# Patient Record
Sex: FEMALE | Race: WHITE | HISPANIC OR LATINO | Employment: FULL TIME | URBAN - METROPOLITAN AREA
[De-identification: names, ages, dates, MRNs, and addresses within clinical notes are randomized per-mention and may not be internally consistent; named-entity substitution may affect disease eponyms.]

---

## 2017-01-17 ENCOUNTER — ALLSCRIPTS OFFICE VISIT (OUTPATIENT)
Dept: OTHER | Facility: OTHER | Age: 42
End: 2017-01-17

## 2017-01-24 ENCOUNTER — ALLSCRIPTS OFFICE VISIT (OUTPATIENT)
Dept: OTHER | Facility: OTHER | Age: 42
End: 2017-01-24

## 2018-01-22 VITALS
WEIGHT: 175 LBS | BODY MASS INDEX: 33.61 KG/M2 | HEIGHT: 61 IN | WEIGHT: 178 LBS | DIASTOLIC BLOOD PRESSURE: 84 MMHG | HEIGHT: 61 IN | SYSTOLIC BLOOD PRESSURE: 120 MMHG | DIASTOLIC BLOOD PRESSURE: 88 MMHG | SYSTOLIC BLOOD PRESSURE: 122 MMHG | BODY MASS INDEX: 33.04 KG/M2

## 2018-03-07 NOTE — PROGRESS NOTES
Plan   Follow-up visit in 2 weeks Evaluation and Treatment  Follow-up  Status: Hold For - Scheduling  Requested for: 29AEP8742  Ordered; For: Tinnitus, bilateral;  Ordered By: Jorge Luis Zuniga  Performed:   Due: 57MCT7719     Assessment    1  Bilateral otitis externa (380 10) (H60 93)   2  Otalgia, bilateral (388 70) (H92 03)   3  Tinnitus, bilateral (388 30) (H93 13)    Chief Complaint  Chief Complaint Free Text Note Form: F/U bilateral ear infections      History of Present Illness  HPI: Ms Danyelle Santiago presents today for follow up due to bilateral otalgia and otorrhea  She has been gradually improving since her last visit  She notices the left ear feels vastly improved and the right continues with occasional otalgia  She has been using the Cortisporin drops as prescribed since her last visit  Review of Systems  Complete ENT ROS [facility]:   Eyes: No complaints of itching, excessive tearing or vision changes  Ears: discharge from the ears, but as noted in HPI  Nose: No nasal obstruction, no discharge or runniness, no bleeding, no dryness, no sneezing and no loss of smell  Mouth: No sores in mouth, no altered taste, no dental problems  Throat: No complaints of throat pain, no difficulty swallowing, no hoarseness  Neck: No neck soreness, no neck pain, no neck lumps or swelling  Genitourinary: No complaints of dysuria, flank pain or frequent urination  Cardiovascular: No complaints of chest pain or palpitations  Respiratory: No complaints of shortness of breath, cough or wheezing  Gastrointestinal: No complaints of heartburn, nausea/vomiting, or constipation  Neurological: No complaints of headache, convulsions or memory loss  Constitutional: No fever, feels well, no tiredness  Psychiatric: No anxiety, no depression, no sleep disturbances  Musculoskeletal: No complaints of arthralgias, myalgias or limb pain  Integumentary: No complaints of skin rash, itching or skin lesions     Endocrine: No complaints of proptosis, muscle weakness or feelings of weakness  Hematologic/Lymphatic: No complaints of swollen glands in the neck, does not bleed easily, does not bruise easily  ROS Reviewed:   ROS reviewed  Active Problems    1  Bilateral otitis externa (380 10) (H60 93)   2  BMI 33 0-33 9,adult (V85 33) (Z68 33)   3  Cellulitis of abdominal wall (682 2) (L03 311)   4  Immunization due (V05 9) (Z23)   5  Obesity (278 00) (E66 9)   6  Otalgia, bilateral (388 70) (H92 03)   7  Otitis externa of left ear (380 10) (H60 92)   8  Tinnitus, bilateral (388 30) (H93 13)    Past Medical History    1  History of Fissure Of The Tongue (750 13)   2  History of headache (V13 89) (D09 121)  Past Medical History Reviewed: The past medical history was reviewed and updated today  Surgical History    1  History of Tubal Ligation  Surgical History Reviewed: The surgical history was reviewed and updated today  Family History  Mother    1  Family history of arthritis (V17 7) (Z82 61)   2  Family history of cerebral infarction (V17 1) (Z82 3)  Grandmother    3  Family history of Diabetes mellitus of other type with hyperosmolarity, with long-term   current use of insulin   4  Family history of diabetes mellitus (V18 0) (Z83 3)  Family History Reviewed: The family history was reviewed and updated today  Social History    · Former smoker (F60 66) (C60 732)   ·   Social History Reviewed: The social history was reviewed and is unchanged  Current Meds   1  Ciprodex 0 3-0 1 % Otic Suspension; INSTILL 3 DROPS IN AFFECTED EAR(S) TWICE   DAILY; Therapy: 87DCV2297 to (Last Rx:[id number])  Requested for: 21TEL9690 Ordered   2  Neomycin-Polymyxin-HC 3 5-16083-7 Otic Suspension; INSTILL 4 DROPS IN BOTH   EARS 3-4 TIMES DAILY; Therapy: 04MHO4438 to (Last Rx:[id number])  Requested for: 86HRY9098 Ordered    Allergies    1   Adhesive Tape TAPE    Vitals  Signs   Recorded: 98DZS0020 63:67KC   Systolic: 011, LUE, Sitting  Diastolic: 88, LUE, Sitting  Height: 5 ft 1 in  Weight: 178 lb   BMI Calculated: 33 63  BSA Calculated: 1 8    Physical Exam    Constitutional:   General appearance: Well developed, well nourished  Ability to communicate: Voice normal  Speech normal    Head and Face:   Head and face: Head normocephalic, atraumatic with no lesions or palpable masses  Submandibular glands and parotid glands: non tender, no masses  Eyes:   Test of Ocular Motility: Gaze normal  No nystagmus  Ears:   Otoscopic examination: Abnormal  The right external canal had a discharge that was ceruminous and fungal in appearance  The left external canal had a discharge that was fungal in appearance  Hearing: Normal    Nose:   External auditory canals: No cerumen impaction noted, no drainage observed, no edema noted in EAC, no exostoses present, no osteoma present, no tenderness noted  External Inspection of Nose: No deformities observed, no deviation of bone structure, no skin lesion present, no swelling present  Nares are symmetric, no deviation of caudal portion of septum  Nasal Mucosa: No congestion observed, no mucosal lesion or masses present, no ulcerations observed  Cartilaginous Septum: midline, no bleeding noted, no crusting present, no perforation noted  Turbinates: No hypertrophy or inflammation noted  unable to visualize due to gag reflex  Mouth: Inspection of Lips, Teeth, Gums: Lips normal in color, moist, no cracks or lesions  No loose teeth, no missing teeth  Gingiva: no bleeding observed, no inflammation present  Hard Palate: no asymmetry observed, no torus present  Soft palate normal with no ulcers noted  Throat:   Examination of Oropharynx: Oral Mucosa: no masses, lesions, leukoplakia, or scarring  Normal Yunier's ducts, pink and moist, no discoloration noted  Floor of mouth: normal Warthin's ducts, no lesions, ulcerations, leukoplakia or torus mandibularis   Tonsils: no hypertrophy or ulcerations noted  Tongue: normal mobility, surfaces without fissures, leukoplakia, ulceration or masses, not enlarged, no pallor noted, no white patches present  unable to visualize due to gag reflex  Neck:   Examination of Neck: No decreased range of motion, trachea midline  Examination of Thyroid: Normal size, non-tender, no palpable masses  Pulmonary:   Respiratory effort: Normal respiratory rate and rhythm, no increased work of breathing  Cardiovascular:   Inspection of Peripheral vascular system by observation: Normal    Lymphatic:   Palpation of Lymph Nodes: Neck: No generalized lymphadenopathy  Neurological/Psychiatric:   Cranial nerves II-VII grossly intact  Oriented to person, place, and time  Cooperative, in no acute distress  Discussion/Summary  Discussion Summary:   Ms Dion Vergara presents today for follow up due to bilateral otalgia, otitis externa, and tinnitus  Noted slight improvement from last visit  Suctioned minimal amount of squamous debris and cerumen from bilateral EAC  Pt tolerated well  Powder with steroid, boric acid, and nystatin applied to bilateral EAC  TO cease use of drops at this time  f/u two weeks  Goals and Barriers: The patient has the current Goals: Continue to improve bilateral otalgia and otorrhea  Patient's Capacity to Self-Care: Patient is able to Self-Care  Transitional Care: Continuing care needed for: Otalgia and otitis externa        Signatures   Electronically signed by : URSULA Valadez ; Jan 31 2017  5:03PM EST                       (Author)

## 2018-03-07 NOTE — CONSULTS
Plan    1  Neomycin-Polymyxin-HC 3 5-67363-3 Otic Suspension; INSTILL 4 DROPS IN   BOTH EARS 3-4 TIMES DAILY    2  Removal of Foreign Body Aud Canal - [facility]; Status:Active - Perform Order; Requested   RPN:[id number];     Assessment    1  Bilateral otitis externa (380 10) (H60 93)   2  Tinnitus, bilateral (388 30) (H93 13)   3  Otalgia, bilateral (388 70) (H92 03)    Chief Complaint  Chief Complaint Free Text Note Form: Presenting bilateral ear pain, discharge and tinnitus  History of Present Illness  HPI: Ms Skinny Kaur presents today as a new patient consult, [address] [place] [address] due to bilateral ear infection  She was in a water park this past Aug 2016 and believes she got water in her ears at that time  In September she experienced an ear infection that she did not seek treatment for the concern at that time  In December 2016 she began with otalgia and otorrhea in the left ear  She was treated by her PCP with Ciprodex  The left ear began to resolve and two weeks later the right ear began with similar symptoms  She has been experiencing intermittent otalgia bilaterally and yellow otorrhea  She is also having intermittent tinnitus bilaterally  Review of Systems  Complete ENT ROS [facility]:   Eyes: No complaints of itching, excessive tearing or vision changes  Ears: discharge from the ears and recent ear infections  Nose: No nasal obstruction, no discharge or runniness, no bleeding, no dryness, no sneezing and no loss of smell  Mouth: No sores in mouth, no altered taste, no dental problems  Throat: No complaints of throat pain, no difficulty swallowing, no hoarseness  Neck: No neck soreness, no neck pain, no neck lumps or swelling  Genitourinary: No complaints of dysuria, flank pain or frequent urination  Cardiovascular: No complaints of chest pain or palpitations  Respiratory: No complaints of shortness of breath, cough or wheezing     Gastrointestinal: No complaints of heartburn, nausea/vomiting, or constipation  Neurological: headache  Constitutional: No fever, feels well, no tiredness  Psychiatric: No anxiety, no depression, no sleep disturbances  Musculoskeletal: No complaints of arthralgias, myalgias or limb pain  Integumentary: No complaints of skin rash, itching or skin lesions  Endocrine: No complaints of proptosis, muscle weakness or feelings of weakness  Hematologic/Lymphatic: No complaints of swollen glands in the neck, does not bleed easily, does not bruise easily  ROS Reviewed:   ROS reviewed  Active Problems    1  BMI 33 0-33 9,adult (V85 33) (Z68 33)   2  Cellulitis of abdominal wall (682 2) (L03 311)   3  Immunization due (V05 9) (Z23)   4  Obesity (278 00) (E66 9)   5  Otitis externa of left ear (380 10) (H60 92)    Past Medical History    1  History of Fissure Of The Tongue (750 13)   2  History of headache (V13 89) (V04 714)  Past Medical History Reviewed: The past medical history was reviewed and updated today  Surgical History    1  History of Tubal Ligation  Surgical History Reviewed: The surgical history was reviewed and updated today  Family History  Mother    1  Family history of arthritis (V17 7) (Z82 61)   2  Family history of cerebral infarction (V17 1) (Z82 3)  Grandmother    3  Family history of Diabetes mellitus of other type with hyperosmolarity, with long-term   current use of insulin   4  Family history of diabetes mellitus (V18 0) (Z83 3)  Family History Reviewed: The family history was reviewed and updated today  Social History    · Former smoker (T50 45) (V51 116)   ·   Social History Reviewed: The social history was reviewed and updated today  Current Meds   1  Ciprodex 0 3-0 1 % Otic Suspension; INSTILL 3 DROPS IN AFFECTED EAR(S) TWICE   DAILY; Therapy: 30FEY6513 to (Last Rx:[id number])  Requested for: 16Hch7490 Ordered    Allergies    1   Adhesive Tape TAPE    Vitals  Signs   Recorded: 69DXL7999 03: 45MQ   Systolic: 996  Diastolic: 84  Height: 5 ft 1 in  Weight: 175 lb   BMI Calculated: 33 07  BSA Calculated: 1 78    Physical Exam    Constitutional:   General appearance: Well developed, well nourished  Ability to communicate: Voice normal  Speech normal    Head and Face:   Head and face: Head normocephalic, atraumatic with no lesions or palpable masses  Submandibular glands and parotid glands: non tender, no masses  Eyes:   Test of Ocular Motility: Gaze normal  No nystagmus  Ears:   Otoscopic examination: Abnormal  The right external canal was erythematous and had a discharge that was ceruminous and fungal in appearance  The left external canal was erythematous and had a discharge that was fungal in appearance  Hearing: Normal    Nose:   External auditory canals: No cerumen impaction noted, no drainage observed, no edema noted in EAC, no exostoses present, no osteoma present, no tenderness noted  External Inspection of Nose: No deformities observed, no deviation of bone structure, no skin lesion present, no swelling present  Nares are symmetric, no deviation of caudal portion of septum  Nasal Mucosa: No congestion observed, no mucosal lesion or masses present, no ulcerations observed  Cartilaginous Septum: midline, no bleeding noted, no crusting present, no perforation noted  Turbinates: No hypertrophy or inflammation noted  unable to visualize due to gag reflex  Mouth: Inspection of Lips, Teeth, Gums: Lips normal in color, moist, no cracks or lesions  No loose teeth, no missing teeth  Gingiva: no bleeding observed, no inflammation present  Hard Palate: no asymmetry observed, no torus present  Soft palate normal with no ulcers noted  Throat:   Examination of Oropharynx: Oral Mucosa: no masses, lesions, leukoplakia, or scarring  Normal Yunier's ducts, pink and moist, no discoloration noted   Floor of mouth: normal Warthin's ducts, no lesions, ulcerations, leukoplakia or torus mandibularis  Tonsils: no hypertrophy or ulcerations noted  Tongue: normal mobility, surfaces without fissures, leukoplakia, ulceration or masses, not enlarged, no pallor noted, no white patches present  unable to visualize due to gag reflex  Neck:   Examination of Neck: No decreased range of motion, trachea midline  Examination of Thyroid: Normal size, non-tender, no palpable masses  Pulmonary:   Respiratory effort: Normal respiratory rate and rhythm, no increased work of breathing  Cardiovascular:   Inspection of Peripheral vascular system by observation: Normal    Lymphatic:   Palpation of Lymph Nodes: Neck: No generalized lymphadenopathy  Neurological/Psychiatric:   Cranial nerves II-VII grossly intact  Oriented to person, place, and time  Cooperative, in no acute distress  Discussion/Summary  Discussion Summary:   Ms Danyelle Santiago presents today as new patient consultation due to bilateral otitis externa, bilateral otalgia, and bilateral tinnitus  On exam, noted grey and white debris bilateral EAC, more on right versus left  Removed debris with suction  Pt tolerated well  Discussed options for treatment including Cortisporin drops  Assured pt this will resolve although may take some time  She will follow up in one to two weeks  Goals and Barriers: The patient has the current Goals: Continue to improve bilateral EAC  Patient's Capacity to Self-Care: Patient is able to Self-Care  Transitional Care: Continuing care needed for: Bilateral EAC otitis        Signatures   Electronically signed by : URSULA Wild ; Jan 17 2017  4:35PM EST                       (Author)

## 2019-04-01 ENCOUNTER — OFFICE VISIT (OUTPATIENT)
Dept: FAMILY MEDICINE CLINIC | Facility: CLINIC | Age: 44
End: 2019-04-01
Payer: COMMERCIAL

## 2019-04-01 VITALS
BODY MASS INDEX: 31.37 KG/M2 | DIASTOLIC BLOOD PRESSURE: 80 MMHG | SYSTOLIC BLOOD PRESSURE: 134 MMHG | HEART RATE: 72 BPM | WEIGHT: 166 LBS | RESPIRATION RATE: 16 BRPM | TEMPERATURE: 97.3 F

## 2019-04-01 DIAGNOSIS — H10.9 BACTERIAL CONJUNCTIVITIS OF RIGHT EYE: Primary | ICD-10-CM

## 2019-04-01 PROCEDURE — 1036F TOBACCO NON-USER: CPT | Performed by: NURSE PRACTITIONER

## 2019-04-01 PROCEDURE — 99213 OFFICE O/P EST LOW 20 MIN: CPT | Performed by: NURSE PRACTITIONER

## 2019-04-01 RX ORDER — OFLOXACIN 3 MG/ML
1 SOLUTION/ DROPS OPHTHALMIC 4 TIMES DAILY
Qty: 5 ML | Refills: 0 | Status: SHIPPED | OUTPATIENT
Start: 2019-04-01 | End: 2021-03-01

## 2019-04-01 RX ORDER — DIPHENOXYLATE HYDROCHLORIDE AND ATROPINE SULFATE 2.5; .025 MG/1; MG/1
1 TABLET ORAL DAILY
COMMUNITY

## 2021-03-01 ENCOUNTER — OFFICE VISIT (OUTPATIENT)
Dept: FAMILY MEDICINE CLINIC | Facility: CLINIC | Age: 46
End: 2021-03-01
Payer: COMMERCIAL

## 2021-03-01 VITALS
HEIGHT: 62 IN | DIASTOLIC BLOOD PRESSURE: 80 MMHG | WEIGHT: 178 LBS | TEMPERATURE: 97.8 F | OXYGEN SATURATION: 98 % | SYSTOLIC BLOOD PRESSURE: 140 MMHG | HEART RATE: 78 BPM | BODY MASS INDEX: 32.76 KG/M2 | RESPIRATION RATE: 18 BRPM

## 2021-03-01 DIAGNOSIS — Z00.00 ROUTINE ADULT HEALTH MAINTENANCE: Primary | ICD-10-CM

## 2021-03-01 DIAGNOSIS — D50.0 IRON DEFICIENCY ANEMIA DUE TO CHRONIC BLOOD LOSS: ICD-10-CM

## 2021-03-01 DIAGNOSIS — R79.89 ELEVATED PLATELET COUNT: ICD-10-CM

## 2021-03-01 PROCEDURE — 99396 PREV VISIT EST AGE 40-64: CPT | Performed by: NURSE PRACTITIONER

## 2021-03-01 PROCEDURE — 1036F TOBACCO NON-USER: CPT | Performed by: NURSE PRACTITIONER

## 2021-03-01 PROCEDURE — 3725F SCREEN DEPRESSION PERFORMED: CPT | Performed by: NURSE PRACTITIONER

## 2021-03-01 NOTE — PATIENT INSTRUCTIONS
Try to take 1 iron tablet daily  Wellness Visit for Adults   AMBULATORY CARE:   A wellness visit  is when you see your healthcare provider to get screened for health problems  Your healthcare provider will also give you advice on how to stay healthy  Write down your questions so you remember to ask them  Ask your healthcare provider how often you should have a wellness visit  What happens at a wellness visit:  Your healthcare provider will ask about your health, and your family history of health problems  This includes high blood pressure, heart disease, and cancer  He or she will ask if you have symptoms that concern you, if you smoke, and about your mood  You may also be asked about your intake of medicines, supplements, food, and alcohol  Any of the following may be done:  · Your weight  will be checked  Your height may also be checked so your body mass index (BMI) can be calculated  Your BMI shows if you are at a healthy weight  · Your blood pressure  and heart rate will be checked  Your temperature may also be checked  · Blood and urine tests  may be done  Blood tests may be done to check your cholesterol levels  Abnormal cholesterol levels increase your risk for heart disease and stroke  You may also need a blood or urine test to check for diabetes if you are at increased risk  Urine tests may be done to look for signs of an infection or kidney disease  · A physical exam  includes checking your heartbeat and lungs with a stethoscope  Your healthcare provider may also check your skin to look for sun damage  · Screening tests  may be recommended  A screening test is done to check for diseases that may not cause symptoms  The screening tests you may need depend on your age, gender, family history, and lifestyle habits  For example, colorectal screening may be recommended if you are 48years old or older      Screening tests you need if you are a woman:   · A Pap smear  is used to screen for cervical cancer  Pap smears are usually done every 3 to 5 years depending on your age  You may need them more often if you have had abnormal Pap smear test results in the past  Ask your healthcare provider how often you should have a Pap smear  · A mammogram  is an x-ray of your breasts to screen for breast cancer  Experts recommend mammograms every 2 years starting at age 48 years  You may need a mammogram at age 52 years or younger if you have an increased risk for breast cancer  Talk to your healthcare provider about when you should start having mammograms and how often you need them  Vaccines you may need:   · Get an influenza vaccine  every year  The influenza vaccine protects you from the flu  Several types of viruses cause the flu  The viruses change over time, so new vaccines are made each year  · Get a tetanus-diphtheria (Td) booster vaccine  every 10 years  This vaccine protects you against tetanus and diphtheria  Tetanus is a severe infection that may cause painful muscle spasms and lockjaw  Diphtheria is a severe bacterial infection that causes a thick covering in the back of your mouth and throat  · Get a human papillomavirus (HPV) vaccine  if you are female and aged 23 to 32 or male 23 to 24 and never received it  This vaccine protects you from HPV infection  HPV is the most common infection spread by sexual contact  HPV may also cause vaginal, penile, and anal cancers  · Get a pneumococcal vaccine  if you are aged 72 years or older  The pneumococcal vaccine is an injection given to protect you from pneumococcal disease  Pneumococcal disease is an infection caused by pneumococcal bacteria  The infection may cause pneumonia, meningitis, or an ear infection  · Get a shingles vaccine  if you are 60 or older, even if you have had shingles before  The shingles vaccine is an injection to protect you from the varicella-zoster virus  This is the same virus that causes chickenpox   Shingles is a painful rash that develops in people who had chickenpox or have been exposed to the virus  How to eat healthy:  My Plate is a model for planning healthy meals  It shows the types and amounts of foods that should go on your plate  Fruits and vegetables make up about half of your plate, and grains and protein make up the other half  A serving of dairy is included on the side of your plate  The amount of calories and serving sizes you need depends on your age, gender, weight, and height  Examples of healthy foods are listed below:  · Eat a variety of vegetables  such as dark green, red, and orange vegetables  You can also include canned vegetables low in sodium (salt) and frozen vegetables without added butter or sauces  · Eat a variety of fresh fruits , canned fruit in 100% juice, frozen fruit, and dried fruit  · Include whole grains  At least half of the grains you eat should be whole grains  Examples include whole-wheat bread, wheat pasta, brown rice, and whole-grain cereals such as oatmeal     · Eat a variety of protein foods such as seafood (fish and shellfish), lean meat, and poultry without skin (turkey and chicken)  Examples of lean meats include pork leg, shoulder, or tenderloin, and beef round, sirloin, tenderloin, and extra lean ground beef  Other protein foods include eggs and egg substitutes, beans, peas, soy products, nuts, and seeds  · Choose low-fat dairy products such as skim or 1% milk or low-fat yogurt, cheese, and cottage cheese  · Limit unhealthy fats  such as butter, hard margarine, and shortening  Exercise:  Exercise at least 30 minutes per day on most days of the week  Some examples of exercise include walking, biking, dancing, and swimming  You can also fit in more physical activity by taking the stairs instead of the elevator or parking farther away from stores  Include muscle strengthening activities 2 days each week  Regular exercise provides many health benefits   It helps you manage your weight, and decreases your risk for type 2 diabetes, heart disease, stroke, and high blood pressure  Exercise can also help improve your mood  Ask your healthcare provider about the best exercise plan for you  General health and safety guidelines:   · Do not smoke  Nicotine and other chemicals in cigarettes and cigars can cause lung damage  Ask your healthcare provider for information if you currently smoke and need help to quit  E-cigarettes or smokeless tobacco still contain nicotine  Talk to your healthcare provider before you use these products  · Limit alcohol  A drink of alcohol is 12 ounces of beer, 5 ounces of wine, or 1½ ounces of liquor  · Lose weight, if needed  Being overweight increases your risk of certain health conditions  These include heart disease, high blood pressure, type 2 diabetes, and certain types of cancer  · Protect your skin  Do not sunbathe or use tanning beds  Use sunscreen with a SPF 15 or higher  Apply sunscreen at least 15 minutes before you go outside  Reapply sunscreen every 2 hours  Wear protective clothing, hats, and sunglasses when you are outside  · Drive safely  Always wear your seatbelt  Make sure everyone in your car wears a seatbelt  A seatbelt can save your life if you are in an accident  Do not use your cell phone when you are driving  This could distract you and cause an accident  Pull over if you need to make a call or send a text message  · Practice safe sex  Use latex condoms if are sexually active and have more than one partner  Your healthcare provider may recommend screening tests for sexually transmitted infections (STIs)  · Wear helmets, lifejackets, and protective gear  Always wear a helmet when you ride a bike or motorcycle, go skiing, or play sports that could cause a head injury  Wear protective equipment when you play sports  Wear a lifejacket when you are on a boat or doing water sports      © Copyright [employer] [facility] Information is for [employer] use only and may not be sold, redistributed or otherwise used for commercial purposes  All illustrations and images included in CareNotes® are the copyrighted property of A D A M , Inc  or [employer]   The above information is an  only  It is not intended as medical advice for individual conditions or treatments  Talk to your doctor, nurse or pharmacist before following any medical regimen to see if it is safe and effective for you

## 2021-03-01 NOTE — PROGRESS NOTES
[facility] HEALTH MAINTENANCE OFFICE VISIT  [facility] - [place] [facility]  OF [facility]    NAME: Shankar Smyth  AGE: 39 y o  SEX: female  : 1975     DATE: 3/2/2021    Assessment and Plan     1  Routine adult health maintenance    2  Iron deficiency anemia due to chronic blood loss  Comments: Will check labs and refer to hematology for further workup and possible Iron infusions  Orders:  -     CBC and differential; Future  -     Iron, TIBC and Ferritin Panel; Future  -     Ambulatory referral to Hematology / Oncology; Future    3  Elevated platelet count  -     CBC and differential; Future  -     Ambulatory referral to Hematology / Oncology; Future        · Patient Counseling:   · Nutrition: Stressed importance of a well balanced diet, moderation of sodium/saturated fat, caloric balance and sufficient intake of fiber  · Exercise: Stressed the importance of regular exercise with a goal of 150 minutes per week  · Dental Health: Discussed daily flossing and brushing and regular dental visits     · Immunizations reviewed: Up To Date  · Discussed benefits of:  Mammogram , Cervical Cancer screening and Screening labs   BMI Counseling: Body mass index is 32 56 kg/m²  Discussed with patient's BMI with her  The BMI is above normal  Exercise recommendations include moderate aerobic physical activity for 150 minutes/week  No follow-ups on file  Chief Complaint     Chief Complaint   Patient presents with    Physical Exam     [id number]       History of Present Illness     She had annual labs done through her ob/gyn and her platelet count was elevated at 479  She was also anemic, which she reports is not new  Hgb 8 8 on current labs  Her menstrual cycles are very heavy  Cycles are regular and last about 4 days  Reports heavy flow for the past 1-2 days and then light  Reports significant constipation with oral iron supplements  Denies any known history of familial or hereditary anemia  Well Adult Physical   Patient here for a comprehensive physical exam       Diet and Physical Activity  Diet: well balanced diet  Exercise: walks frequently      Depression Screen  PHQ-9 Depression Screening    PHQ-9:   Frequency of the following problems over the past two weeks:      Little interest or pleasure in doing things: 0 - not at all  Feeling down, depressed, or hopeless: 0 - not at all  PHQ-2 Score: 0          General Health  Hearing: Normal:  bilateral  Vision: goes for regular eye exams and wears contacts  Dental: regular dental visits and brushes teeth twice daily    Reproductive Health  Follows with gynecologist      The following portions of the patient's history were reviewed and updated as appropriate: allergies, current medications, past family history, past medical history, past social history, past surgical history and problem list     Review of Systems     Review of Systems   Constitutional: Negative  Respiratory: Negative  Cardiovascular: Negative  Gastrointestinal: Negative  Neurological: Negative  Psychiatric/Behavioral: Negative  Past Medical History     History reviewed  No pertinent past medical history  Past Surgical History     History reviewed  No pertinent surgical history      Social History     Social History     Socioeconomic History    Marital status: /Civil Union     Spouse name: None    Number of children: None    Years of education: None    Highest education level: None   Occupational History    None   Social Needs    Financial resource strain: None    Food insecurity     Worry: None     Inability: None    Transportation needs     Medical: None     Non-medical: None   Tobacco Use    Smoking status: Never Smoker    Smokeless tobacco: Never Used   Substance and Sexual Activity    Alcohol use: Yes     Frequency: 2-4 times a month    Drug use: Never    Sexual activity: None   Lifestyle    Physical activity     Days per week: None Minutes per session: None    Stress: None   Relationships    Social connections     Talks on phone: None     Gets together: None     Attends Restorationist service: None     Active member of club or organization: None     Attends meetings of clubs or organizations: None     Relationship status: None    Intimate partner violence     Fear of current or ex partner: None     Emotionally abused: None     Physically abused: None     Forced sexual activity: None   Other Topics Concern    None   Social History Narrative    None       Family History     History reviewed  No pertinent family history  Current Medications       Current Outpatient Medications:     multivitamin (THERAGRAN) TABS, Take 1 tablet by mouth daily, Disp: , Rfl:      Allergies     Allergies   Allergen Reactions    Medical Tape        Objective     /80   Pulse 78   Temp 97 8 °F (36 6 °C)   Resp 18   Ht 5' 2" (1 575 m)   Wt 80 7 kg (178 lb)   LMP 02/23/2021 (Exact Date)   SpO2 98%   BMI 32 56 kg/m²      Physical Exam  Constitutional:       Appearance: She is well-developed  HENT:      Head: Normocephalic  Right Ear: External ear normal       Left Ear: External ear normal       Nose: Nose normal    Eyes:      Conjunctiva/sclera: Conjunctivae normal       Pupils: Pupils are equal, round, and reactive to light  Neck:      Musculoskeletal: Neck supple  Thyroid: No thyromegaly  Cardiovascular:      Rate and Rhythm: Normal rate and regular rhythm  Heart sounds: Normal heart sounds  No murmur  Pulmonary:      Effort: Pulmonary effort is normal       Breath sounds: Normal breath sounds  Abdominal:      General: Bowel sounds are normal  There is no distension  Palpations: Abdomen is soft  Musculoskeletal: Normal range of motion  Lymphadenopathy:      Cervical: No cervical adenopathy  Skin:     General: Skin is warm and dry  Capillary Refill: Capillary refill takes less than 2 seconds     Neurological: Mental Status: She is alert and oriented to person, place, and time  Deep Tendon Reflexes: Reflexes are normal and symmetric     Psychiatric:         Mood and Affect: Mood normal          Behavior: Behavior normal             Visual Acuity Screening    Right eye Left eye Both eyes   Without correction:      With correction: 20/25 20/25 20/25           Aziza Kaba[address]

## 2021-03-03 ENCOUNTER — TELEPHONE (OUTPATIENT)
Dept: ADMINISTRATIVE | Facility: OTHER | Age: 46
End: 2021-03-03

## 2021-03-03 NOTE — TELEPHONE ENCOUNTER
Upon review of the In Basket request and the patient's chart, initial outreach has been made via fax, please see Contacts section for details       Thank you  Desire Clay

## 2021-03-03 NOTE — LETTER
Procedure Request Form: Mammogram      Date Requested: 21  Patient: Inell Squire  Patient : 1975   Referring Provider: Erving Hem, CRNP        Date of Procedure ______________________________       The above patient has informed us that they have completed their   most recent Mammogram at your facility  Please complete   this form and attach all corresponding procedure reports/results  Comments __________________________________________________________  ____________________________________________________________________  ____________________________________________________________________  ____________________________________________________________________    Facility Completing Procedure _________________________________________    Form Completed By (print name) _______________________________________      Signature __________________________________________________________      These reports are needed for  compliance    Please fax this completed form and a copy of the procedure report to our office located at [place] [address] as soon as possible to [phone] attention Carmell Leventhal: Phone [phone]    We thank you for your assistance in treating our mutual patient

## 2021-03-03 NOTE — TELEPHONE ENCOUNTER
----- Message from [address] sent at 3/2/2021 11:20 AM EST -----  03/02/21 11:20 AM    Hello, our patient Ho Gnozalez has had Mammogram completed/performed  Please assist in updating the patient chart by making an External outreach to [address]  located in [place]  The date of service is February 2021      Thank you,  LOUISE Lyn  [facility]

## 2021-03-04 NOTE — TELEPHONE ENCOUNTER
Upon review of the In Basket request we were able to locate, review, and update the patient chart as requested for Mammogram     Any additional questions or concerns should be emailed to the Practice Liaisons via [email or web address] email, please do not reply via In Basket      Thank you  Carey Colunga

## 2021-03-12 ENCOUNTER — TELEPHONE (OUTPATIENT)
Dept: FAMILY MEDICINE CLINIC | Facility: CLINIC | Age: 46
End: 2021-03-12

## 2021-03-12 LAB
BASOPHILS # BLD AUTO: 83 CELLS/UL (ref 0–200)
BASOPHILS NFR BLD AUTO: 1.2 %
EOSINOPHIL # BLD AUTO: 262 CELLS/UL (ref 15–500)
EOSINOPHIL NFR BLD AUTO: 3.8 %
ERYTHROCYTE [DISTWIDTH] IN BLOOD BY AUTOMATED COUNT: 17.1 % (ref 11–15)
FERRITIN SERPL-MCNC: 3 NG/ML (ref 16–232)
HCT VFR BLD AUTO: 28.7 % (ref 35–45)
HGB BLD-MCNC: 8.4 G/DL (ref 11.7–15.5)
IRON SATN MFR SERPL: 3 % (CALC) (ref 16–45)
IRON SERPL-MCNC: 12 MCG/DL (ref 40–190)
LYMPHOCYTES # BLD AUTO: 2739 CELLS/UL (ref 850–3900)
LYMPHOCYTES NFR BLD AUTO: 39.7 %
MCH RBC QN AUTO: 20 PG (ref 27–33)
MCHC RBC AUTO-ENTMCNC: 29.3 G/DL (ref 32–36)
MCV RBC AUTO: 68.3 FL (ref 80–100)
MONOCYTES # BLD AUTO: 559 CELLS/UL (ref 200–950)
MONOCYTES NFR BLD AUTO: 8.1 %
NEUTROPHILS # BLD AUTO: 3257 CELLS/UL (ref 1500–7800)
NEUTROPHILS NFR BLD AUTO: 47.2 %
PLATELET # BLD AUTO: 448 THOUSAND/UL (ref 140–400)
PMV BLD REES-ECKER: 11.2 FL (ref 7.5–12.5)
RBC # BLD AUTO: 4.2 MILLION/UL (ref 3.8–5.1)
TIBC SERPL-MCNC: 365 MCG/DL (CALC) (ref 250–450)
WBC # BLD AUTO: 6.9 THOUSAND/UL (ref 3.8–10.8)

## 2021-03-12 NOTE — TELEPHONE ENCOUNTER
Patient returned call and she is aware, she states she will call hematologist and make an appt asap  No further action needed    Madison Lambert LPN

## 2021-03-12 NOTE — TELEPHONE ENCOUNTER
Please let pt know that her blood work shows that her iron levels are very low, and her hemoglobin has now dropped to 8 4  I gave her a referral for hematology and do not see that anything is scheduled  Please advise her to schedule an appt ASAP for further evaluation and treatment    Thanks, Yolette Cobos

## 2021-03-23 ENCOUNTER — TELEPHONE (OUTPATIENT)
Dept: SURGICAL ONCOLOGY | Facility: CLINIC | Age: 46
End: 2021-03-23

## 2021-03-23 NOTE — TELEPHONE ENCOUNTER
New Patient Encounter    New Patient Intake Form   Patient Details:  Carmen Abdul  1975  [id number]    Background Information:  [address] starts by opening a telephone encounter and gathering the following information   Who is calling to schedule? If not self, relationship to patient? self   Referring Provider [place]   What is the diagnosis? Low iron   Is this diagnosis confirmed? Yes   When was the diagnosis? 3/23   Is there a confirmed diagnosis from a biopsy/tissue reviewed by pathology? na   Were outside slides requested? No   Is patient aware of diagnosis? Yes   Is there a personal history and what kind? No   Is there a family history and what kind? No   Reason for visit? New Diagnosis   Have you had any imaging or labs done? If so: when, where? yes  [facility]   Are records in [place]? yes   If patient has a prior history of breast cancer were old records obtained? No   Was the patient told to bring a disk? No   Does the patient smoke or Vape? No   If yes, how many packs or cartridges per day? na   Scheduling Information:   Preferred [place]:  [place]     Are there any dates/time the patient cannot be seen? na   Miscellaneous: na   After completing the above information, please route to Financial Counselor and the appropriate Nurse Navigator for review

## 2021-03-30 ENCOUNTER — DOCUMENTATION (OUTPATIENT)
Dept: HEMATOLOGY ONCOLOGY | Facility: MEDICAL CENTER | Age: 46
End: 2021-03-30

## 2021-04-01 ENCOUNTER — CONSULT (OUTPATIENT)
Dept: HEMATOLOGY ONCOLOGY | Facility: MEDICAL CENTER | Age: 46
End: 2021-04-01
Payer: COMMERCIAL

## 2021-04-01 VITALS
WEIGHT: 177 LBS | TEMPERATURE: 96.9 F | SYSTOLIC BLOOD PRESSURE: 132 MMHG | BODY MASS INDEX: 32.57 KG/M2 | OXYGEN SATURATION: 100 % | DIASTOLIC BLOOD PRESSURE: 82 MMHG | HEIGHT: 62 IN | HEART RATE: 75 BPM | RESPIRATION RATE: 18 BRPM

## 2021-04-01 DIAGNOSIS — D75.838 REACTIVE THROMBOCYTOSIS: ICD-10-CM

## 2021-04-01 DIAGNOSIS — D50.0 IRON DEFICIENCY ANEMIA DUE TO CHRONIC BLOOD LOSS: Primary | ICD-10-CM

## 2021-04-01 PROCEDURE — 99204 OFFICE O/P NEW MOD 45 MIN: CPT | Performed by: PHYSICIAN ASSISTANT

## 2021-04-01 PROCEDURE — 1036F TOBACCO NON-USER: CPT | Performed by: PHYSICIAN ASSISTANT

## 2021-04-01 PROCEDURE — 3008F BODY MASS INDEX DOCD: CPT | Performed by: NURSE PRACTITIONER

## 2021-04-01 RX ORDER — SODIUM CHLORIDE 9 MG/ML
20 INJECTION, SOLUTION INTRAVENOUS ONCE
Status: CANCELLED | OUTPATIENT
Start: 2021-04-06

## 2021-04-01 NOTE — PROGRESS NOTES
[facility] [address] [address]  Hematology Ambulatory Consult  Arjun Gtz, 1975, [id number]  4/1/2021    Assessment/Plan:  1  Iron deficiency anemia due to chronic blood loss  Likely secondary to heavy menses  Suggested having a discussion with her gynecologist for options regarding heavy menses and patient is not concerned at this time as it is manageable to her  Discussed dietary sources of iron since patient cannot tolerate oral iron supplement due to nausea and constipation even taking it every other day  Will complete feraheme 510mg IV x2  Discussed side effects of IV iron including headaches, nausea, allergic reaction, tattooing of skin, and irritation around needle site  Patient to call after first infusion if any difficulty arises so the premedications can be adjusted  Will follow up in three months with labs, UA, and fecal occult blood test  Instructed patient not to complete the UA and fecal occult blood test during menses  - CBC and differential; Future  - Iron Panel (Includes Ferritin, Iron Sat%, Iron, and TIBC); Future  - Ferritin; Future  - Occult Blood, Fecal Immunochemical; Future  - UA w Reflex to Microscopic w Reflex to Culture; Future    2  Reactive thrombocytosis  Likely secondary to severe iron deficiency anemia  No features of MPN  Will repeat CBC diff in three months      - CBC and differential; Future    The patient is scheduled for follow-up in approximately 3 months with Dr Chandana Irvin  Patient voiced agreement and understanding to the above  Patient knows to call the Hematology/Oncology office with any questions and concerns regarding the above  Barrier(s) to care: None     The patient is able to self care     -------------------------------------------------------------------------------------------------------    Chief Complaint   Patient presents with   Sybil [facility] Consult     Low Iron     Referring provider:  LOUISE Costa    History of present illness: This is a 74-year-old female with past medical history of anemia starting in 2016 secondary to heavy menstruation presents to the Hematology Clinic for evaluation of severe iron deficiency with iron saturation of 3%, ferritin 3% in April 2021  Interval history: Complains of dyspnea on exertion, restless legs and cramping at night particularly during menses, and fatigue wishing she could sleep throughout the day  Denies pica, palpitations, hair loss, blood in urine or stool, dark stools, abdominal surgeries other than tubal ligation  Menses every 26 days and last 4-5 days  First two days are extremely heavy and she is using a super tampon and pad and needing to change both within 2 hour intervals  Menstruations have been heavier since 2016, age 38  Currently taking oral iron supplement every other day as well as eating foods rich in iron such as beet juice and liver  Difficulty tolerating oral iron supplement due to constipation and nausea  Denies history of blood clots, easy bleeding, bleeding from gums, blood in urine or stool, dark stool, easy bruising, petechiae  Review of Systems   Constitutional: Positive for activity change and fatigue  Negative for appetite change, fever and unexpected weight change  HENT: Negative for nosebleeds  Respiratory: Positive for shortness of breath (on exersion and with Stairs)  Negative for cough and choking  Negative hemoptysis  Cardiovascular: Negative for chest pain, palpitations and leg swelling  Gastrointestinal: Positive for constipation (oral iron)  Negative for abdominal distention, abdominal pain, anal bleeding, blood in stool, diarrhea, nausea and vomiting  Endocrine: Negative  Negative for cold intolerance  Genitourinary: Positive for menstrual problem (4 terrible days with changing pad + tampon every 1-2 hours  )   Negative for hematuria, vaginal bleeding, vaginal discharge and vaginal pain  Musculoskeletal: Positive for arthralgias (rc on periods) and myalgias (rc on periods)  Negative for neck pain and neck stiffness  Skin: Negative  Negative for color change, pallor and rash  Allergic/Immunologic: Negative  Negative for immunocompromised state  Neurological: Negative  Negative for weakness and headaches  Hematological: Negative for adenopathy  Does not bruise/bleed easily  All other systems reviewed and are negative  Patient Active Problem List   Diagnosis    Elevated platelet count    Iron deficiency anemia due to chronic blood loss       No past medical history on file      Past Surgical History:   Procedure Laterality Date    TUBAL LIGATION  2008       Family History   Problem Relation Age of Onset    Lupus Mother     Thyroid disease Sister     Hypertension Sister        Social History     Socioeconomic History    Marital status: /Civil Union     Spouse name: None    Number of children: None    Years of education: None    Highest education level: None   Occupational History    None   Social Needs    Financial resource strain: None    Food insecurity     Worry: None     Inability: None    Transportation needs     Medical: None     Non-medical: None   Tobacco Use    Smoking status: Never Smoker    Smokeless tobacco: Never Used   Substance and Sexual Activity    Alcohol use: Yes     Frequency: 2-4 times a month    Drug use: Never    Sexual activity: None   Lifestyle    Physical activity     Days per week: None     Minutes per session: None    Stress: None   Relationships    Social connections     Talks on phone: None     Gets together: None     Attends Islam service: None     Active member of club or organization: None     Attends meetings of clubs or organizations: None     Relationship status: None    Intimate partner violence     Fear of current or ex partner: None     Emotionally abused: None Physically abused: None     Forced sexual activity: None   Other Topics Concern    None   Social History Narrative    None         Current Outpatient Medications:     multivitamin (THERAGRAN) TABS, Take 1 tablet by mouth daily, Disp: , Rfl:     Allergies   Allergen Reactions    Medical Tape        Objective:  /82 (BP Location: Left arm, Patient Position: Sitting, Cuff Size: Adult)   Pulse 75   Temp (!) 96 9 °F (36 1 °C)   Resp 18   Ht 5' 2" (1 575 m)   Wt 80 3 kg (177 lb)   SpO2 100%   BMI 32 37 kg/m²   Physical Exam  Constitutional:       General: She is not in acute distress  Appearance: She is well-developed  HENT:      Head: Normocephalic and atraumatic  Mouth/Throat:      Pharynx: No oropharyngeal exudate  Eyes:      General: No scleral icterus  Conjunctiva/sclera: Conjunctivae normal       Pupils: Pupils are equal, round, and reactive to light  Neck:      Musculoskeletal: Normal range of motion  Cardiovascular:      Rate and Rhythm: Normal rate and regular rhythm  Heart sounds: No murmur  Pulmonary:      Effort: Pulmonary effort is normal  No respiratory distress  Breath sounds: Normal breath sounds  Abdominal:      General: Bowel sounds are normal  There is no distension  Palpations: Abdomen is soft  Tenderness: There is no abdominal tenderness  Musculoskeletal: Normal range of motion  Lymphadenopathy:      Cervical: No cervical adenopathy  Skin:     General: Skin is warm  Coloration: Skin is not pale  Findings: No rash  Neurological:      Mental Status: She is alert and oriented to person, place, and time  Cranial Nerves: No cranial nerve deficit  Psychiatric:         Thought Content: Thought content normal          Result Review  Labs:  No visits with results within 3 Week(s) from this visit     Latest known visit with results is:   Orders Only on 03/11/2021   Component Date Value Ref Range Status    Iron, Serum 03/11/2021 12* 40 - 190 mcg/dL Final    Total Iron Binding Capacity (TIBC) 03/11/2021 365  250 - 450 mcg/dL (calc) Final    Iron Saturation 03/11/2021 3* 16 - 45 % (calc) Final    White Blood Cell Count 03/11/2021 6 9  3 8 - 10 8 Thousand/uL Final    Red Blood Cell Count 03/11/2021 4 20  3 80 - 5 10 Million/uL Final    Hemoglobin 03/11/2021 8 4* 11 7 - 15 5 g/dL Final    HCT 03/11/2021 28 7* 35 0 - 45 0 % Final    MCV 03/11/2021 68 3* 80 0 - 100 0 fL Final    MCH 03/11/2021 20 0* 27 0 - 33 0 pg Final    MCHC 03/11/2021 29 3* 32 0 - 36 0 g/dL Final    RDW 03/11/2021 17 1* 11 0 - 15 0 % Final    Platelet Count 02/15/9207 448* 140 - 400 Thousand/uL Final    SL AMB MPV 03/11/2021 11 2  7 5 - 12 5 fL Final    Neutrophils (Absolute) 03/11/2021 3,257  1,500 - 7,800 cells/uL Final    Lymphocytes (Absolute) 03/11/2021 2,739  850 - 3,900 cells/uL Final    Monocytes (Absolute) 03/11/2021 559  200 - 950 cells/uL Final    Eosinophils (Absolute) 03/11/2021 262  15 - 500 cells/uL Final    Basophils ABS 03/11/2021 83  0 - 200 cells/uL Final    Neutrophils 03/11/2021 47 2  % Final    Lymphocytes 03/11/2021 39 7  % Final    Monocytes 03/11/2021 8 1  % Final    Eosinophils 03/11/2021 3 8  % Final    Basophils PCT 03/11/2021 1 2  % Final    Ferritin 03/11/2021 3* 16 - 232 ng/mL Final    RBC Morphology 03/11/2021   NORMAL Final    Anisocytosis 1 +Microcytosis 2 +Polychromasia 1 +Hypochromasia 1 +Ovalocytes 1 +    Note: 03/11/2021    Final     Although an "automated CBC" was ordered, ourinstrumentation detected an abnormality onyour patient's specimen requiring us to performa manual review  Please note: This report has been generated by a voice recognition software system  Therefore there may be syntax, spelling, and/or grammatical errors  Please call if you have any questions

## 2021-04-01 NOTE — PATIENT INSTRUCTIONS
Iron Rich Diet   WHAT YOU NEED TO KNOW:   An iron-rich diet includes foods that are good sources of iron  People need extra iron during childhood, adolescence (teenage years), and pregnancy  Iron is a mineral that your body needs to make hemoglobin  Hemoglobin is part of your blood and helps carry oxygen from your lungs to the rest of your body  You may need to follow this diet to treat or prevent a low blood iron level or iron deficiency anemia  DISCHARGE INSTRUCTIONS:   Daily iron needs:   · Males:      ? 3to 1years old: 7 mg    ? 3to 6years old: 10 mg    ? 5to 15years old: 8 mg    ? 15to 25years old: 11 mg    ? 19 years and older: 8 mg    · Females:      ? 3to 1years old: 7 mg    ? 3to 6years old: 10 mg    ? 5to 15years old: 8 mg    ? 15to 25years old: 15 mg    ? 19 to 50 years: 18 mg    ? Over 46years old: 8 mg    ? Pregnant women:  27 mg    Foods that contain iron:   · Meat, fish, and poultry are good sources of iron  They contain heme iron, a form of iron that your body absorbs very well  Fruit, vegetables, eggs, and grains such as pasta, rice, and cereal also contain iron  They contain nonheme iron, a form of iron that is not absorbed as well as heme iron  You can absorb more iron from these foods by eating a food that is high in vitamin C at the same time  You can also absorb more nonheme iron by eating a food from the meat, fish, and poultry group at the same time  · Fish and shellfish contain some mercury, a metal that can be harmful to the body  Children and unborn babies are at higher risk for harm caused by mercury  Children and pregnant women should avoid eating fish high in mercury, such as shark and swordfish  They should also eat only fish that are lower in mercury, such as salmon, canned light tuna, and catfish  Limit the amount of low-mercury fish and shellfish you eat to less than 12 ounces per week      Iron-rich foods:   · Foods that contain 2 mg or more per serving: ? 3 ounces of cooked beef (chad, eye of round) or cooked turkey (dark meat)    ? ½ cup of beans (black, kidney, or lentil, or soybeans)    ? ½ cup of tofu    ? 1 medium baked potato    ? 1 cup of cooked artichoke or cooked spinach    ? ¾ cup of instant oatmeal    ? 1 cup of corn flakes    · Foods that contain 1 to 2 mg per serving:      ? 3 ounces of chicken    ? 3 ounces of pork    ? 3 ounces of turkey (light meat)    ? 3 ounces of light tuna    ? ½ cup of seedless, packed raisins    ? 1 slice of whole-wheat or white bread    Good sources of vitamin C:  Eat a serving of vitamin C with any iron-rich food to help your body absorb more iron  The following fruits and vegetables are good sources of vitamin C:  · 1 cup of fresh orange juice (124 mg) or pink grapefruit juice (83 mg)    · 1 cup of strawberries (106 mg)    · 1 cup of diced cantaloupe (68 mg)     · 1 cup of sweet yellow pepper (283 mg)    · 1 cup of fresh, boiled broccoli (116 mg) or cooked brussels sprouts (97 mg)    · 1 cup of kale (53 mg)    · 1 cup of tomato juice (45 mg)    Other guidelines to follow:   · Tea and coffee can decrease the amount of iron that your body absorbs from iron-rich foods  Drink coffee and tea separately from meals that contain iron-rich foods  · Children over the age of 1 year only need about 24 ounces of cow's milk each day  When children drink too much milk, they may eat fewer iron-rich foods  This may cause them to have a low level of iron in their blood  Risks: If you do not eat iron-rich foods and vitamin C every day, you may have low blood iron levels  This may lead to iron deficiency anemia, especially during periods when your body needs extra iron  Iron deficiency anemia may cause problems with your child's growth and development  If you have iron deficiency anemia, you may develop other health problems     © Copyright [address] [place] [address] Drive Information is for End User's use only and may not be sold, redistributed or otherwise used for commercial purposes  All illustrations and images included in CareNotes® are the copyrighted property of A D A M , Inc  or [employer]  The above information is an  only  It is not intended as medical advice for individual conditions or treatments  Talk to your doctor, nurse or pharmacist before following any medical regimen to see if it is safe and effective for you  Ferumoxytol (By injection)   Ferumoxytol ([id number]-su-ZEL-i-may)  Treats iron deficiency anemia  Brand Name(s): Feraheme   There may be other brand names for this medicine  When This Medicine Should Not Be Used: This medicine is not right for everyone  You should not receive it if you had an allergic reaction to ferumoxytol or to any product that contains iron  How to Use This Medicine:   Injectable  · Your doctor will prescribe your dose and schedule  This medicine is given through a needle placed in a vein  It must be given slowly, so the needle will have to stay in place for at least 15 minutes  Your doctor may need you to stay for at least 30 minutes after receiving the medicine to check for unwanted effects  · A nurse or other health provider will give you this medicine  · Read and follow the patient instructions that come with this medicine  Talk to your doctor or pharmacist if you have any questions  Drugs and Foods to Avoid:   Ask your doctor or pharmacist before using any other medicine, including over-the-counter medicines, vitamins, and herbal products  · If you are also using cancer medicines, take them at least 30 minutes after receiving this medicine  Warnings While Using This Medicine:   · Tell your doctor if you are pregnant or breastfeeding, or if you have kidney disease, liver disease, low blood pressure, or a history of drug allergies  Tell your doctor if you are on dialysis  · This medicine may cause low blood pressure    · Tell any doctor or dentist who treats you that you are using this medicine  This medicine may affect certain medical test results  · Your doctor will do lab tests at regular visits to check on the effects of this medicine  Keep all appointments  Possible Side Effects While Using This Medicine:   Call your doctor right away if you notice any of these side effects:  · Allergic reaction: Itching or hives, swelling in your face or hands, swelling or tingling in your mouth or throat, chest tightness, trouble breathing  · Lightheadedness, dizziness, fainting  If you notice these less serious side effects, talk with your doctor:   · Pain, itching, burning, swelling, or a lump under your skin where the needle is placed  If you notice other side effects that you think are caused by this medicine, tell your doctor  Call your doctor for medical advice about side effects  You may report side effects to FDA at 0-557-FDA-8109  © Copyright [address] [place] [address] Information is for End User's use only and may not be sold, redistributed or otherwise used for commercial purposes  The above information is an  only  It is not intended as medical advice for individual conditions or treatments  Talk to your doctor, nurse or pharmacist before following any medical regimen to see if it is safe and effective for you

## 2021-04-08 ENCOUNTER — TELEPHONE (OUTPATIENT)
Dept: HEMATOLOGY ONCOLOGY | Facility: MEDICAL CENTER | Age: 46
End: 2021-04-08

## 2021-04-08 DIAGNOSIS — D50.0 IRON DEFICIENCY ANEMIA DUE TO CHRONIC BLOOD LOSS: Primary | ICD-10-CM

## 2021-04-08 RX ORDER — SODIUM CHLORIDE 9 MG/ML
20 INJECTION, SOLUTION INTRAVENOUS ONCE
Status: CANCELLED | OUTPATIENT
Start: 2021-04-13

## 2021-04-08 NOTE — TELEPHONE ENCOUNTER
Insurance is not approving treatment with feraheme, will need to change to venofer weekly x 7 treatments  Need to inform patient and adjust schedule  Left message for patient to call office back

## 2021-04-08 NOTE — TELEPHONE ENCOUNTER
Another message was left for Nini Cruz to contact the office regarding her iron treatments  We changed the treatment from feraheme x2 to venofer weekly x7, her insurance would not authorize feraheme, Also her dates have been changed from [address] [place] [address] to [address] [place] [address]  Awaiting to see if this day 4/13/21 is ok with the patient before additional dates are scheduled with [facility] infusion

## 2021-04-12 ENCOUNTER — TELEPHONE (OUTPATIENT)
Dept: HEMATOLOGY ONCOLOGY | Facility: MEDICAL CENTER | Age: 46
End: 2021-04-12

## 2021-04-12 ENCOUNTER — HOSPITAL ENCOUNTER (OUTPATIENT)
Dept: INFUSION CENTER | Facility: HOSPITAL | Age: 46
Discharge: HOME | End: 2021-04-12
Attending: INTERNAL MEDICINE

## 2021-04-12 NOTE — TELEPHONE ENCOUNTER
LVM for patient with the results  Provided office phone number for a return call if she has any questions     ----- Message from Sorin Jansen PA-C sent at 4/12/2021  9:28 AM EDT -----  Negative stool sample- no blood  Please notify pt

## 2021-04-13 ENCOUNTER — HOSPITAL ENCOUNTER (OUTPATIENT)
Dept: INFUSION CENTER | Facility: HOSPITAL | Age: 46
Discharge: HOME | End: 2021-04-13
Attending: INTERNAL MEDICINE
Payer: COMMERCIAL

## 2021-04-13 VITALS
SYSTOLIC BLOOD PRESSURE: 133 MMHG | HEART RATE: 67 BPM | DIASTOLIC BLOOD PRESSURE: 89 MMHG | TEMPERATURE: 97.2 F | RESPIRATION RATE: 18 BRPM | OXYGEN SATURATION: 100 %

## 2021-04-13 DIAGNOSIS — D50.0 IRON DEFICIENCY ANEMIA DUE TO CHRONIC BLOOD LOSS: Primary | ICD-10-CM

## 2021-04-13 PROCEDURE — 96365 THER/PROPH/DIAG IV INF INIT: CPT

## 2021-04-13 RX ORDER — SODIUM CHLORIDE 9 MG/ML
20 INJECTION, SOLUTION INTRAVENOUS ONCE
Status: COMPLETED | OUTPATIENT
Start: 2021-04-13 | End: 2021-04-13

## 2021-04-13 RX ORDER — SODIUM CHLORIDE 9 MG/ML
20 INJECTION, SOLUTION INTRAVENOUS ONCE
Status: CANCELLED | OUTPATIENT
Start: 2021-04-22

## 2021-04-13 RX ADMIN — SODIUM CHLORIDE 20 ML/HR: 9 INJECTION, SOLUTION INTRAVENOUS at 11:40

## 2021-04-13 RX ADMIN — IRON SUCROSE 200 MG: 20 INJECTION, SOLUTION INTRAVENOUS at 11:40

## 2021-04-22 ENCOUNTER — HOSPITAL ENCOUNTER (OUTPATIENT)
Dept: INFUSION CENTER | Facility: HOSPITAL | Age: 46
Discharge: HOME | End: 2021-04-22
Attending: INTERNAL MEDICINE
Payer: COMMERCIAL

## 2021-04-22 VITALS
RESPIRATION RATE: 18 BRPM | TEMPERATURE: 97.3 F | DIASTOLIC BLOOD PRESSURE: 86 MMHG | OXYGEN SATURATION: 100 % | SYSTOLIC BLOOD PRESSURE: 135 MMHG | HEART RATE: 73 BPM

## 2021-04-22 DIAGNOSIS — D50.0 IRON DEFICIENCY ANEMIA DUE TO CHRONIC BLOOD LOSS: Primary | ICD-10-CM

## 2021-04-22 PROCEDURE — 96365 THER/PROPH/DIAG IV INF INIT: CPT

## 2021-04-22 RX ORDER — SODIUM CHLORIDE 9 MG/ML
20 INJECTION, SOLUTION INTRAVENOUS ONCE
Status: COMPLETED | OUTPATIENT
Start: 2021-04-22 | End: 2021-04-22

## 2021-04-22 RX ORDER — SODIUM CHLORIDE 9 MG/ML
20 INJECTION, SOLUTION INTRAVENOUS ONCE
Status: CANCELLED | OUTPATIENT
Start: 2021-04-29

## 2021-04-22 RX ADMIN — SODIUM CHLORIDE 20 ML/HR: 9 INJECTION, SOLUTION INTRAVENOUS at 15:02

## 2021-04-22 RX ADMIN — IRON SUCROSE 200 MG: 20 INJECTION, SOLUTION INTRAVENOUS at 15:04

## 2021-04-22 NOTE — PLAN OF CARE
Problem: Potential for Falls  Goal: Patient will remain free of falls  Description: INTERVENTIONS:  - Assess patient frequently for physical needs  -  Identify cognitive and physical deficits and behaviors that affect risk of falls    -  [place] fall precautions as indicated by assessment   - Educate patient/family on patient safety including physical limitations  - Instruct patient to call for assistance with activity based on assessment  - Modify environment to reduce risk of injury  Outcome: Progressing

## 2021-04-29 ENCOUNTER — HOSPITAL ENCOUNTER (OUTPATIENT)
Dept: INFUSION CENTER | Facility: HOSPITAL | Age: 46
Discharge: HOME | End: 2021-04-29
Attending: INTERNAL MEDICINE
Payer: COMMERCIAL

## 2021-04-29 VITALS
DIASTOLIC BLOOD PRESSURE: 78 MMHG | HEART RATE: 74 BPM | OXYGEN SATURATION: 99 % | SYSTOLIC BLOOD PRESSURE: 138 MMHG | RESPIRATION RATE: 16 BRPM | TEMPERATURE: 97.9 F

## 2021-04-29 DIAGNOSIS — D50.0 IRON DEFICIENCY ANEMIA DUE TO CHRONIC BLOOD LOSS: Primary | ICD-10-CM

## 2021-04-29 PROCEDURE — 96365 THER/PROPH/DIAG IV INF INIT: CPT

## 2021-04-29 RX ORDER — SODIUM CHLORIDE 9 MG/ML
20 INJECTION, SOLUTION INTRAVENOUS ONCE
Status: COMPLETED | OUTPATIENT
Start: 2021-04-29 | End: 2021-04-29

## 2021-04-29 RX ORDER — SODIUM CHLORIDE 9 MG/ML
20 INJECTION, SOLUTION INTRAVENOUS ONCE
Status: CANCELLED | OUTPATIENT
Start: 2021-05-06

## 2021-04-29 RX ADMIN — IRON SUCROSE 200 MG: 20 INJECTION, SOLUTION INTRAVENOUS at 15:14

## 2021-04-29 RX ADMIN — SODIUM CHLORIDE 20 ML/HR: 9 INJECTION, SOLUTION INTRAVENOUS at 15:14

## 2021-04-29 NOTE — PLAN OF CARE
Problem: Potential for Falls  Goal: Patient will remain free of falls  Description: INTERVENTIONS:  - Assess patient frequently for physical needs  -  Identify cognitive and physical deficits and behaviors that affect risk of falls    -  [place] fall precautions as indicated by assessment   - Educate patient/family on patient safety including physical limitations  - Instruct patient to call for assistance with activity based on assessment  - Modify environment to reduce risk of injury  - Consider OT/PT consult to assist with strengthening/mobility  Outcome: Progressing

## 2021-05-06 ENCOUNTER — HOSPITAL ENCOUNTER (OUTPATIENT)
Dept: INFUSION CENTER | Facility: HOSPITAL | Age: 46
Discharge: HOME | End: 2021-05-06
Attending: INTERNAL MEDICINE
Payer: COMMERCIAL

## 2021-05-06 VITALS
RESPIRATION RATE: 16 BRPM | SYSTOLIC BLOOD PRESSURE: 136 MMHG | OXYGEN SATURATION: 100 % | DIASTOLIC BLOOD PRESSURE: 74 MMHG | HEART RATE: 66 BPM | TEMPERATURE: 97.8 F

## 2021-05-06 DIAGNOSIS — D50.0 IRON DEFICIENCY ANEMIA DUE TO CHRONIC BLOOD LOSS: Primary | ICD-10-CM

## 2021-05-06 PROCEDURE — 96365 THER/PROPH/DIAG IV INF INIT: CPT

## 2021-05-06 RX ORDER — SODIUM CHLORIDE 9 MG/ML
20 INJECTION, SOLUTION INTRAVENOUS ONCE
Status: COMPLETED | OUTPATIENT
Start: 2021-05-06 | End: 2021-05-06

## 2021-05-06 RX ORDER — SODIUM CHLORIDE 9 MG/ML
20 INJECTION, SOLUTION INTRAVENOUS ONCE
Status: CANCELLED | OUTPATIENT
Start: 2021-05-13

## 2021-05-06 RX ADMIN — IRON SUCROSE 200 MG: 20 INJECTION, SOLUTION INTRAVENOUS at 15:16

## 2021-05-06 RX ADMIN — SODIUM CHLORIDE 20 ML/HR: 9 INJECTION, SOLUTION INTRAVENOUS at 15:16

## 2021-05-06 NOTE — PLAN OF CARE
Problem: Potential for Falls  Goal: Patient will remain free of falls  Description: INTERVENTIONS:  - Assess patient frequently for physical needs  -  Identify cognitive and physical deficits and behaviors that affect risk of falls    -  [place] fall precautions as indicated by assessment   - Educate patient/family on patient safety including physical limitations  - Instruct patient to call for assistance with activity based on assessment  - Modify environment to reduce risk of injury  - Consider OT/PT consult to assist with strengthening/mobility  Outcome: Progressing

## 2021-05-13 ENCOUNTER — HOSPITAL ENCOUNTER (OUTPATIENT)
Dept: INFUSION CENTER | Facility: HOSPITAL | Age: 46
Discharge: HOME | End: 2021-05-13
Attending: INTERNAL MEDICINE
Payer: COMMERCIAL

## 2021-05-13 VITALS
TEMPERATURE: 97.8 F | DIASTOLIC BLOOD PRESSURE: 78 MMHG | RESPIRATION RATE: 18 BRPM | OXYGEN SATURATION: 100 % | SYSTOLIC BLOOD PRESSURE: 142 MMHG | HEART RATE: 73 BPM

## 2021-05-13 DIAGNOSIS — D50.0 IRON DEFICIENCY ANEMIA DUE TO CHRONIC BLOOD LOSS: Primary | ICD-10-CM

## 2021-05-13 PROCEDURE — 96365 THER/PROPH/DIAG IV INF INIT: CPT

## 2021-05-13 RX ORDER — SODIUM CHLORIDE 9 MG/ML
20 INJECTION, SOLUTION INTRAVENOUS ONCE
Status: CANCELLED | OUTPATIENT
Start: 2021-05-20

## 2021-05-13 RX ORDER — SODIUM CHLORIDE 9 MG/ML
20 INJECTION, SOLUTION INTRAVENOUS ONCE
Status: COMPLETED | OUTPATIENT
Start: 2021-05-13 | End: 2021-05-13

## 2021-05-13 RX ADMIN — IRON SUCROSE 200 MG: 20 INJECTION, SOLUTION INTRAVENOUS at 15:09

## 2021-05-13 RX ADMIN — SODIUM CHLORIDE 20 ML/HR: 9 INJECTION, SOLUTION INTRAVENOUS at 15:09

## 2021-05-13 NOTE — PLAN OF CARE
Problem: Potential for Falls  Goal: Patient will remain free of falls  Description: INTERVENTIONS:  - Assess patient frequently for physical needs  -  Identify cognitive and physical deficits and behaviors that affect risk of falls    -  [place] fall precautions as indicated by assessment   - Educate patient/family on patient safety including physical limitations  - Instruct patient to call for assistance with activity based on assessment  - Modify environment to reduce risk of injury  Outcome: Progressing

## 2021-05-19 ENCOUNTER — TELEPHONE (OUTPATIENT)
Dept: HEMATOLOGY ONCOLOGY | Facility: MEDICAL CENTER | Age: 46
End: 2021-05-19

## 2021-05-19 ENCOUNTER — TELEPHONE (OUTPATIENT)
Dept: INFUSION CENTER | Facility: HOSPITAL | Age: 46
End: 2021-05-19

## 2021-05-19 NOTE — PROGRESS NOTES
Patient called to cancel her next two Venofer appointments due to being billed for treatment and she can't afford it  Jerri Lala rn from Dr Moraima Banegas office notified  Oriented - self; Oriented - place; Oriented - time

## 2021-05-19 NOTE — TELEPHONE ENCOUNTER
Call received from [facility] infusion center  Patient cancelled last 2 iron infusions  She stated she received a bill for $500 for her treatments so far, and she cannot afford that  I will pass message on to finance team to contact patient

## 2021-05-19 NOTE — TELEPHONE ENCOUNTER
Pt  Called and cx'd remaining appts  due to not being able to afford tx  Stated was billed 500$ per visit

## 2021-05-20 ENCOUNTER — HOSPITAL ENCOUNTER (OUTPATIENT)
Dept: INFUSION CENTER | Facility: HOSPITAL | Age: 46
Discharge: HOME | End: 2021-05-20
Attending: INTERNAL MEDICINE

## 2021-06-28 ENCOUNTER — TELEPHONE (OUTPATIENT)
Dept: HEMATOLOGY ONCOLOGY | Facility: CLINIC | Age: 46
End: 2021-06-28

## 2021-06-28 NOTE — TELEPHONE ENCOUNTER
Appointment Cancellation Or Reschedule     Person calling in patient   Provider [address]   Office Visit Date and Time 7-1 at 320pm   Office Visit Location [facility]   Did patient want to reschedule their office appointment? If so, when was it scheduled to? Yes 7-22 at 1040am   Is this patient calling to reschedule an infusion appointment? no   Is this patient a Chemo patient? no   When is their next infusion visit? n/a   Reason for Cancellation or Reschedule Scheduling conflict     If the patient is a treatment patient, please route this to the office nurse  If the patient is not on treatment, please route to the office MA

## 2021-07-21 ENCOUNTER — TELEPHONE (OUTPATIENT)
Dept: HEMATOLOGY ONCOLOGY | Facility: CLINIC | Age: 46
End: 2021-07-21

## 2021-07-21 NOTE — TELEPHONE ENCOUNTER
Reschedule Appointment     Who is calling in Patient    Doctor Appointment Scheduled with Dr Loki Hayes date and time 07/22 at 10:40am   New date and time 09/09 at 3:00pm   Location [facility]   Patient verbalized understanding    yes

## 2021-09-08 ENCOUNTER — TELEPHONE (OUTPATIENT)
Dept: HEMATOLOGY ONCOLOGY | Facility: CLINIC | Age: 46
End: 2021-09-08

## 2021-09-08 NOTE — TELEPHONE ENCOUNTER
Appointment Cancellation Or Reschedule     Person calling in Patient    Provider Dr Morgan Renee   Office Visit Date and Time 09/09/2021   Office Visit Location [place]   Did patient want to reschedule their office appointment? If so, when was it scheduled to? Not at this time    Is this patient calling to reschedule an infusion appointment? no   When is their next infusion appointment? NA   Is this patient a Chemo patient? no   Reason for Cancellation or Reschedule Patient has training at work not able to leave early      If the patient is a treatment patient, please route this to the office nurse  If the patient is not on treatment, please route to the office MA

## 2022-02-25 ENCOUNTER — IMMUNIZATIONS (OUTPATIENT)
Dept: FAMILY MEDICINE CLINIC | Facility: HOSPITAL | Age: 47
End: 2022-02-25

## 2022-02-25 DIAGNOSIS — Z23 ENCOUNTER FOR IMMUNIZATION: Primary | ICD-10-CM

## 2022-02-25 PROCEDURE — 91306 COVID-19 MODERNA VACC 0.25 ML BOOSTER: CPT

## 2022-02-25 PROCEDURE — 0064A COVID-19 MODERNA VACC 0.25 ML BOOSTER: CPT

## 2024-06-30 ENCOUNTER — HOSPITAL ENCOUNTER (EMERGENCY)
Facility: HOSPITAL | Age: 49
Discharge: HOME | End: 2024-06-30
Attending: EMERGENCY MEDICINE
Payer: COMMERCIAL

## 2024-06-30 VITALS
WEIGHT: 138 LBS | TEMPERATURE: 98.8 F | BODY MASS INDEX: 25.4 KG/M2 | DIASTOLIC BLOOD PRESSURE: 82 MMHG | RESPIRATION RATE: 20 BRPM | SYSTOLIC BLOOD PRESSURE: 198 MMHG | OXYGEN SATURATION: 97 % | HEART RATE: 79 BPM | HEIGHT: 62 IN

## 2024-06-30 DIAGNOSIS — T14.8XXA WOUND INFECTION: Primary | ICD-10-CM

## 2024-06-30 DIAGNOSIS — L08.9 WOUND INFECTION: Primary | ICD-10-CM

## 2024-06-30 PROCEDURE — 99284 EMERGENCY DEPT VISIT MOD MDM: CPT | Performed by: EMERGENCY MEDICINE

## 2024-06-30 PROCEDURE — 99283 EMERGENCY DEPT VISIT LOW MDM: CPT

## 2024-06-30 RX ORDER — CEPHALEXIN 500 MG/1
500 CAPSULE ORAL EVERY 8 HOURS SCHEDULED
Qty: 21 CAPSULE | Refills: 0 | Status: SHIPPED | OUTPATIENT
Start: 2024-06-30 | End: 2024-07-07

## 2024-06-30 RX ORDER — CEPHALEXIN 500 MG/1
500 CAPSULE ORAL ONCE
Status: COMPLETED | OUTPATIENT
Start: 2024-06-30 | End: 2024-06-30

## 2024-06-30 RX ADMIN — CEPHALEXIN 500 MG: 500 CAPSULE ORAL at 16:25

## 2024-06-30 NOTE — ED PROVIDER NOTES
History  Chief Complaint   Patient presents with    Finger Injury     Patient comes today due to left thumb with raised area that was a blood blister from cutting grass five weeks ago, which patient popped it five weeks ago, but now it is raised and looks abnormal, cannot wear band aids is allergic to the materials     49-year-old female presents to the ED for evaluation of wound to left thumb.  Patient states that about 5 weeks ago she sustained a blood blister after cutting grass.  Since then the blister popped however there is no soft tissue growth that is popping out and the area is painful.  Patient is not able to cover this as she is allergic to Band-Aids.  Subsequently patient came to the ED for further evaluation.  Area is mildly tender to palpation.      History provided by:  Patient   used: No        Prior to Admission Medications   Prescriptions Last Dose Informant Patient Reported? Taking?   multivitamin (THERAGRAN) TABS  Self Yes Yes   Sig: Take 1 tablet by mouth daily      Facility-Administered Medications: None       History reviewed. No pertinent past medical history.    Past Surgical History:   Procedure Laterality Date    TUBAL LIGATION  2008       Family History   Problem Relation Age of Onset    Lupus Mother     Thyroid disease Sister     Hypertension Sister      I have reviewed and agree with the history as documented.    E-Cigarette/Vaping    E-Cigarette Use Never User      E-Cigarette/Vaping Substances     Social History     Tobacco Use    Smoking status: Never    Smokeless tobacco: Never   Vaping Use    Vaping status: Never Used   Substance Use Topics    Alcohol use: Yes    Drug use: Never       Review of Systems   Constitutional:  Negative for chills and fever.   HENT:  Negative for ear pain and sore throat.    Eyes:  Negative for pain and visual disturbance.   Respiratory:  Negative for cough and shortness of breath.    Cardiovascular:  Negative for chest pain and  palpitations.   Gastrointestinal:  Negative for abdominal pain and vomiting.   Genitourinary:  Negative for dysuria and hematuria.   Musculoskeletal:  Negative for arthralgias and back pain.   Skin:  Positive for wound. Negative for color change and rash.   Neurological:  Negative for seizures and syncope.   All other systems reviewed and are negative.      Physical Exam  Physical Exam  Vitals and nursing note reviewed.   Constitutional:       General: She is not in acute distress.     Appearance: She is well-developed.   HENT:      Head: Normocephalic and atraumatic.   Eyes:      Conjunctiva/sclera: Conjunctivae normal.   Cardiovascular:      Rate and Rhythm: Normal rate and regular rhythm.      Heart sounds: No murmur heard.  Pulmonary:      Effort: Pulmonary effort is normal. No respiratory distress.      Breath sounds: Normal breath sounds.   Abdominal:      Palpations: Abdomen is soft.      Tenderness: There is no abdominal tenderness.   Musculoskeletal:         General: No swelling.      Cervical back: Neck supple.      Comments: Positive tender bilateral upper extremity.  0.5 cm circular soft tissue lesion noted to left thumb.  Please see picture below for clarification.  Area is mildly tender to palpation.   Skin:     General: Skin is warm and dry.      Capillary Refill: Capillary refill takes less than 2 seconds.   Neurological:      Mental Status: She is alert.   Psychiatric:         Mood and Affect: Mood normal.           Vital Signs  ED Triage Vitals [06/30/24 1604]   Temperature Pulse Respirations Blood Pressure SpO2   98.8 °F (37.1 °C) 79 20 (!) 198/82 97 %      Temp Source Heart Rate Source Patient Position - Orthostatic VS BP Location FiO2 (%)   Oral Monitor Sitting Right arm --      Pain Score       --           Vitals:    06/30/24 1604   BP: (!) 198/82   Pulse: 79   Patient Position - Orthostatic VS: Sitting         Visual Acuity      ED Medications  Medications   cephalexin (KEFLEX) capsule 500 mg  (500 mg Oral Given 6/30/24 4155)       Diagnostic Studies  Results Reviewed       None                   No orders to display              Procedures  Procedures         ED Course                               SBIRT 22yo+      Flowsheet Row Most Recent Value   Initial Alcohol Screen: US AUDIT-C     1. How often do you have a drink containing alcohol? 0 Filed at: 06/30/2024 1606   2. How many drinks containing alcohol do you have on a typical day you are drinking?  0 Filed at: 06/30/2024 1606   3a. Male UNDER 65: How often do you have five or more drinks on one occasion? 0 Filed at: 06/30/2024 1606   3b. FEMALE Any Age, or MALE 65+: How often do you have 4 or more drinks on one occassion? 0 Filed at: 06/30/2024 1606   Audit-C Score 0 Filed at: 06/30/2024 1606   JUANCARLOS: How many times in the past year have you...    Used an illegal drug or used a prescription medication for non-medical reasons? Never Filed at: 06/30/2024 1606                      Medical Decision Making  Patient history and physical, the etiology of patient's soft tissue lesion to left thumb is unclear.  Patient placed on p.o. and topical antibiotic and discharged with follow-up to dermatology for further evaluation management. Close return instructions given to return to the ER for any worsening symptoms.  Patient agrees with discharge plan.  Patient well appearing at time of discharge.    Please Note: Fluency Direct voice recognition software may have been used in the creation of this document. Wrong words or sound a like substitutions may have occurred due to the inherent limitations of the voice software.         Risk  Prescription drug management.             Disposition  Final diagnoses:   Wound infection     Time reflects when diagnosis was documented in both MDM as applicable and the Disposition within this note       Time User Action Codes Description Comment    6/30/2024  4:23 PM Misha Green Add [T14.8XXA,  L08.9] Wound infection            ED Disposition       ED Disposition   Discharge    Condition   Stable    Date/Time   Sun Jun 30, 2024 1623    Comment   Cherry Pierre discharge to home/self care.                   Follow-up Information       Follow up With Specialties Details Why Contact Info Additional Information    [facility] Dermat[facility] Schedule an appointment as soon as possible for a visit   [address] [address]  [phone] [facility], [address] [facility], [place] [address], [place], [place], [id number] [phone]            Discharge Medication List as of 6/30/2024  4:24 PM        START taking these medications    Details   cephalexin (KEFLEX) 500 mg capsule Take 1 capsule (500 mg total) by mouth every 8 (eight) hours for 7 days, Starting Sun 6/30/2024, Until Sun 7/7/2024, Normal      mupirocin (BACTROBAN) 2 % ointment Apply topically 3 (three) times a day, Starting Sun 6/30/2024, Normal           CONTINUE these medications which have NOT CHANGED    Details   multivitamin (THERAGRAN) TABS Take 1 tablet by mouth daily, Historical Med             No discharge procedures on file.    PDMP Review       None            ED Provider  Electronically Signed by             Misha Green DO  06/30/24 7175

## 2024-06-30 NOTE — ED NOTES
Left thumb with noted raised abnormal looking circular area, where blood blister was, no drainage noted, denies pain.     Lainey Luong RN  06/30/24 1128

## 2024-07-02 ENCOUNTER — TELEPHONE (OUTPATIENT)
Age: 49
End: 2024-07-02

## 2024-07-02 NOTE — TELEPHONE ENCOUNTER
Patient called for new patient appointment, offered soonest appointment 12/2024.  Patient will seek treatment at another location.